# Patient Record
Sex: MALE | Race: WHITE | Employment: FULL TIME | URBAN - METROPOLITAN AREA
[De-identification: names, ages, dates, MRNs, and addresses within clinical notes are randomized per-mention and may not be internally consistent; named-entity substitution may affect disease eponyms.]

---

## 2019-08-06 ENCOUNTER — HOSPITAL ENCOUNTER (EMERGENCY)
Facility: CLINIC | Age: 29
Discharge: HOME | End: 2019-08-06
Attending: EMERGENCY MEDICINE | Admitting: EMERGENCY MEDICINE
Payer: OTHER MISCELLANEOUS

## 2019-08-06 VITALS
HEIGHT: 67 IN | TEMPERATURE: 97.7 F | SYSTOLIC BLOOD PRESSURE: 126 MMHG | DIASTOLIC BLOOD PRESSURE: 83 MMHG | WEIGHT: 125 LBS | BODY MASS INDEX: 19.62 KG/M2 | HEART RATE: 72 BPM | RESPIRATION RATE: 16 BRPM | OXYGEN SATURATION: 100 %

## 2019-08-06 DIAGNOSIS — S81.812A LACERATION OF LEFT LOWER EXTREMITY, INITIAL ENCOUNTER: ICD-10-CM

## 2019-08-06 PROCEDURE — 99283 EMERGENCY DEPT VISIT LOW MDM: CPT | Mod: 25

## 2019-08-06 PROCEDURE — 90715 TDAP VACCINE 7 YRS/> IM: CPT | Performed by: EMERGENCY MEDICINE

## 2019-08-06 PROCEDURE — 90471 IMMUNIZATION ADMIN: CPT

## 2019-08-06 PROCEDURE — 12002 RPR S/N/AX/GEN/TRNK2.6-7.5CM: CPT

## 2019-08-06 PROCEDURE — 25000128 H RX IP 250 OP 636: Performed by: EMERGENCY MEDICINE

## 2019-08-06 RX ADMIN — CLOSTRIDIUM TETANI TOXOID ANTIGEN (FORMALDEHYDE INACTIVATED), CORYNEBACTERIUM DIPHTHERIAE TOXOID ANTIGEN (FORMALDEHYDE INACTIVATED), BORDETELLA PERTUSSIS TOXOID ANTIGEN (GLUTARALDEHYDE INACTIVATED), BORDETELLA PERTUSSIS FILAMENTOUS HEMAGGLUTININ ANTIGEN (FORMALDEHYDE INACTIVATED), BORDETELLA PERTUSSIS PERTACTIN ANTIGEN, AND BORDETELLA PERTUSSIS FIMBRIAE 2/3 ANTIGEN 0.5 ML: 5; 2; 2.5; 5; 3; 5 INJECTION, SUSPENSION INTRAMUSCULAR at 11:15

## 2019-08-06 ASSESSMENT — MIFFLIN-ST. JEOR: SCORE: 1480.63

## 2019-08-06 NOTE — ED PROVIDER NOTES
"  History     Chief Complaint:  Laceration    HPI   Indra Barr is a 29 year old, otherwise healthy male who presents for evaluation of a laceration. This morning,  He reports he was cutting a metal wire at work. When he tried to step over it, he did not quite make it all the way and sustained a laceration to his left calf. Here, he denies any numbness or tingling to the lower extremity. He denies any other injuries today or current symptoms. He does not know when his last tetanus booster was, but he does report being fully vaccinated as a child. He is not anticoagulated.     Allergies:  NKDA     Medications:    The patient is currently on no regular medications.      Past Medical History:    The patient denies any significant past medical history.    Past Surgical History:    The patient does not have any pertinent past surgical history  Family History:    No past pertinent family history.    Social History:  Incident occurred at work.     Review of Systems   All other systems reviewed and are negative.    Physical Exam     Patient Vitals for the past 24 hrs:   BP Temp Temp src Pulse Resp SpO2 Height Weight   08/06/19 1012 126/83 97.7  F (36.5  C) Temporal 72 16 100 % 1.702 m (5' 7\") 56.7 kg (125 lb)      Physical Exam  General: Resting on the bed.  Head: No obvious trauma to head.  Ears, Nose, Throat:  External ears normal.  Nose normal.    Eyes:  Conjunctivae clear.  CV: Regular rate and rhythm.  No murmurs.      Respiratory: Effort normal and breath sounds normal.  No wheezing or crackles.   Gastrointestinal: Soft.  No distension. There is no tenderness.   Musculoskeletal: Full strength with plantar and dorsiflexion of the left foot.  2+ DP pulses.  Sensation intact throughout.  Neuro: Alert. Moving all extremities appropriately.  Normal speech.    Skin: Skin is warm and dry.  3.5 cm laceration noted on the left lower extremity on the medial aspect.    Emergency Department Course     Procedures:    " Laceration Repair        LACERATION:  A simple clean 3.5 cm laceration.      LOCATION:  Left calf      FUNCTION:  Distally sensation, circulation, motor and tendon function are intact.      ANESTHESIA:  Local using 0.5% bupivacaine w/ epinephrine       PREPARATION:  Irrigation and Scrubbing with Normal Saline and Sea Clens      DEBRIDEMENT:  no debridement      CLOSURE:  Wound was closed with One Layer.  Skin closed with 6 x 4.0 Ethilon using interrupted sutures.    Interventions:  1115 Tdap, 0.5 mL, IM injection    Emergency Department Course:  Nursing notes and vitals reviewed.   1014: I performed an exam of the patient as documented above.      Wound anesthetized and cleaned.     Medicine administered as documented above.     1125: I rechecked the patient and performed the laceration repair, as documented above.     I personally answered all related questions prior to discharge. He was given a referral to primary care for suture removal.       Impression & Plan      Medical Decision Making:  Indra Barr is a 29 year old male presented to the Emergency Department with a laceration.  Given the time of the injury, the wound was felt amenable to primary closure.  After adequate anesthesia was obtained, the wound was thoroughly irrigated and examined.  There is no evidence of muscular, tendon, or bony involvement at this time, nor signs or symptoms of neurovascular compromise or foreign body.  We discussed possible x-ray but given this is a superficial laceration and we are able to see the base of the wound did not feel that x-ray is warranted to look for foreign body.  Do not feel that foreign body is likely.  No signs of any deeper space injuries.  Wound was closed with sutures as noted above.  Tetanus was updated.    We discussed appropriate wound care instructions including keeping the wound dry for the first 24-48 hours, followed be gentle cleansing with soap and water.  We discussed application of sunscreen  to the affected area once scar has formed, to minimize long term scar formation.  Warning signs of infection (erythema, warmth, worsening pain, drainage of pus) were discussed, which should prompt return to the ER for re-evaluation and the patient verbalized understanding.  Will plan for suture/staple removal in 7-10 days.  Patient was encouraged to return to the ER or follow-up with PCP in the meantime should any new or troubling symptoms develop.      Diagnosis:    ICD-10-CM    1. Laceration of left lower extremity, initial encounter S81.812A        Disposition:  discharged to home    Scribe Disclosure:  I, Caren Ansari, am serving as a scribe on 8/6/2019 at 10:13 AM to personally document services performed by Alejandrina Daniels MD based on my observations and the provider's statements to me.      Caren Ansari  8/6/2019    EMERGENCY DEPARTMENT       Alejandrina Daniels MD  08/06/19 2407

## 2019-08-06 NOTE — ED AVS SNAPSHOT
Emergency Department  [address] [place] [id number]  Phone:  [phone]  Fax:  [phone]                                    Indra Barr   MRN: [id number]    Department:   Emergency Department   Date of Visit:  8/6/2019           After Visit Summary Signature Page    I have received my discharge instructions, and my questions have been answered. I have discussed any challenges I see with this plan with the nurse or doctor.    ..........................................................................................................................................  Patient/Patient Representative Signature      ..........................................................................................................................................  Patient Representative Print Name and Relationship to Patient    ..................................................               ................................................  Date                                   Time    ..........................................................................................................................................  Reviewed by Signature/Title    ...................................................              ..............................................  Date                                               Time          22EPIC Rev 08/18

## 2019-08-06 NOTE — DISCHARGE INSTRUCTIONS
Please return to the the ED if you notice increasing redness, warmth, swelling, drainage or fevers, this is concerning for infection.  Keep wound clean and dry, wash after the first 24 hours with warm soapy water.      Have sutures removed in 7-10 days    Discharge Instructions  Laceration (Cut)    You were seen today for a laceration (cut).  Your provider examined your laceration for any problems such a buried foreign body (like glass, a splinter, or gravel), or injury to blood vessels, tendons, and nerves.  Your provider may have also rinsed and/or scrubbed your laceration to help prevent an infection. It may not be possible to find all problems with your laceration on the first visit; occasionally foreign bodies or a tendon injury can go undetected.    Your laceration may have been closed in one of several ways:  No closure: many wounds will heal just fine without closure.  Stitches: regular stitches that require removal.  Staples: skin staples are often used in the scalp/head.  Wound adhesive (glue): skin glue can be used for certain lacerations and doesn t require removal.  Wound strips (aka Butterfly bandages or steri-strips): these are bandages that help to close a wound.  Absorbable stitches:  dissolving  stitches that go away on their own and usually don t require removal.    A small percentage of wounds will develop an infection regardless of how well the wound is cared for. Antibiotics are generally not indicated to prevent an infection so are only given for a small number of high-risk wounds. Some lacerations are too high risk to close, and are left open to heal because closure can increase the likelihood that an infection will develop.    Remember that all lacerations, no matter how expertly repaired, will cause scarring. We consider many factors, techniques, and materials, in our efforts to provide the best possible cosmetic outcome.    Generally, every Emergency Department visit should have a  follow-up clinic visit with either a primary or a specialty clinic/provider. Please follow-up as instructed by your emergency provider today.     Return to the Emergency Department right away if:  You have more redness, swelling, pain, drainage (pus), a bad smell, or red streaking from your laceration as these symptoms could indicate an infection.  You have a fever of 100.4 F or more.  You have bleeding that you cannot stop at home. If your cut starts to bleed, hold pressure on the bleeding area with a clean cloth or put pressure over the bandage.  If the bleeding does not stop after using constant pressure for 30 minutes, you should return to the Emergency Department for further treatment.  An area past the laceration is cool, pale, or blue compared with the other side, or has a slower return of color when squeezed.  Your dressing seems too tight or starts to get uncomfortable or painful. For children, signs of a problem might be irritability or restlessness.  You have loss of normal function or use of an area, such as being unable to straighten or bend a finger normally.  You have a numb area past the laceration.    Return to the Emergency Department or see your regular provider if:  The laceration starts to come open.   You have something coming out of the cut or a feeling that there is something in the laceration.  Your wound will not heal, or keeps breaking open. There can always be glass, wood, dirt or other things in any wound.  They will not always show up, even on x-rays.  If a wound does not heal, this may be why, and it is important to follow-up with your regular provider.    Home Care:  Take your dressing off in 12-24 hours, or as instructed by your provider, to check your laceration. Remove the dressing sooner if it seems too tight or painful, or if it is getting numb, tingly, or pale past the dressing.  Gently wash your laceration 1-2 times daily with clean water and mild soap. It is okay to shower or  run clean water over the laceration, but do not let the laceration soak in water (no swimming).  If your laceration was closed with wound adhesive or strips: pat it dry and leave it open to the air. For all other repairs: after you wash your laceration, or at least 2 times a day, apply antibiotic ointment (such as Neosporin  or Bacitracin ) to the laceration, then cover it with a Band-Aid  or gauze.  Keep the laceration clean. Wear gloves or other protective clothing if you are around dirt.    Follow-up for removal:  If your wound was closed with staples or regular stitches, they need to be removed according to the instructions and timeline specified by your provider today.  If your wound was closed with absorbable ( dissolving ) sutures, they should fall out, dissolve, or not be visible in about one week. If they are still visible, then they should be removed according to the instructions and timeline specified by your provider today.    Scars:  To help minimize scarring:  Wear sunscreen over the healed laceration when out in the sun.  Massage the area regularly once healed.  You may apply Vitamin E to the healed wound.  Wait. Scars improve in appearance over months and years.    If you were given a prescription for medicine here today, be sure to read all of the information (including the package insert) that comes with your prescription.  This will include important information about the medicine, its side effects, and any warnings that you need to know about.  The pharmacist who fills the prescription can provide more information and answer questions you may have about the medicine.  If you have questions or concerns that the pharmacist cannot address, please call or return to the Emergency Department.       Remember that you can always come back to the Emergency Department if you are not able to see your regular provider in the amount of time listed above, if you get any new symptoms, or if there is anything  that worries you.

## 2025-05-28 ENCOUNTER — OFFICE VISIT (OUTPATIENT)
Dept: INTERNAL MEDICINE | Facility: CLINIC | Age: 35
End: 2025-05-28
Payer: COMMERCIAL

## 2025-05-28 VITALS
HEART RATE: 108 BPM | RESPIRATION RATE: 10 BRPM | WEIGHT: 133.6 LBS | SYSTOLIC BLOOD PRESSURE: 104 MMHG | DIASTOLIC BLOOD PRESSURE: 63 MMHG | TEMPERATURE: 98 F | BODY MASS INDEX: 20.97 KG/M2 | OXYGEN SATURATION: 96 % | HEIGHT: 67 IN

## 2025-05-28 DIAGNOSIS — Z13.228 SCREENING FOR METABOLIC DISORDER: ICD-10-CM

## 2025-05-28 DIAGNOSIS — M25.50 CHRONIC JOINT PAIN: Primary | ICD-10-CM

## 2025-05-28 DIAGNOSIS — Z11.59 NEED FOR HEPATITIS C SCREENING TEST: ICD-10-CM

## 2025-05-28 DIAGNOSIS — Z11.4 SCREENING FOR HIV (HUMAN IMMUNODEFICIENCY VIRUS): ICD-10-CM

## 2025-05-28 DIAGNOSIS — G89.29 CHRONIC JOINT PAIN: Primary | ICD-10-CM

## 2025-05-28 DIAGNOSIS — Z13.0 SCREENING, IRON DEFICIENCY ANEMIA: ICD-10-CM

## 2025-05-28 LAB
ERYTHROCYTE [DISTWIDTH] IN BLOOD BY AUTOMATED COUNT: 12.6 % (ref 10–15)
HCT VFR BLD AUTO: 41.8 % (ref 40–53)
HGB BLD-MCNC: 14.8 G/DL (ref 13.3–17.7)
MCH RBC QN AUTO: 30.3 PG (ref 26.5–33)
MCHC RBC AUTO-ENTMCNC: 35.4 G/DL (ref 31.5–36.5)
MCV RBC AUTO: 86 FL (ref 78–100)
PLATELET # BLD AUTO: 244 10E3/UL (ref 150–450)
RBC # BLD AUTO: 4.88 10E6/UL (ref 4.4–5.9)
WBC # BLD AUTO: 11.9 10E3/UL (ref 4–11)

## 2025-05-28 PROCEDURE — 87389 HIV-1 AG W/HIV-1&-2 AB AG IA: CPT | Performed by: STUDENT IN AN ORGANIZED HEALTH CARE EDUCATION/TRAINING PROGRAM

## 2025-05-28 PROCEDURE — 99385 PREV VISIT NEW AGE 18-39: CPT | Performed by: STUDENT IN AN ORGANIZED HEALTH CARE EDUCATION/TRAINING PROGRAM

## 2025-05-28 PROCEDURE — 3078F DIAST BP <80 MM HG: CPT | Performed by: STUDENT IN AN ORGANIZED HEALTH CARE EDUCATION/TRAINING PROGRAM

## 2025-05-28 PROCEDURE — 86803 HEPATITIS C AB TEST: CPT | Performed by: STUDENT IN AN ORGANIZED HEALTH CARE EDUCATION/TRAINING PROGRAM

## 2025-05-28 PROCEDURE — 85027 COMPLETE CBC AUTOMATED: CPT | Performed by: STUDENT IN AN ORGANIZED HEALTH CARE EDUCATION/TRAINING PROGRAM

## 2025-05-28 PROCEDURE — 3074F SYST BP LT 130 MM HG: CPT | Performed by: STUDENT IN AN ORGANIZED HEALTH CARE EDUCATION/TRAINING PROGRAM

## 2025-05-28 PROCEDURE — 36415 COLL VENOUS BLD VENIPUNCTURE: CPT | Performed by: STUDENT IN AN ORGANIZED HEALTH CARE EDUCATION/TRAINING PROGRAM

## 2025-05-28 PROCEDURE — 80053 COMPREHEN METABOLIC PANEL: CPT | Performed by: STUDENT IN AN ORGANIZED HEALTH CARE EDUCATION/TRAINING PROGRAM

## 2025-05-28 PROCEDURE — 99213 OFFICE O/P EST LOW 20 MIN: CPT | Mod: 25 | Performed by: STUDENT IN AN ORGANIZED HEALTH CARE EDUCATION/TRAINING PROGRAM

## 2025-05-28 RX ORDER — CYCLOBENZAPRINE HCL 5 MG
5 TABLET ORAL 3 TIMES DAILY PRN
Qty: 45 TABLET | Refills: 1 | Status: SHIPPED | OUTPATIENT
Start: 2025-05-28 | End: 2025-06-27

## 2025-05-28 SDOH — HEALTH STABILITY: PHYSICAL HEALTH: ON AVERAGE, HOW MANY MINUTES DO YOU ENGAGE IN EXERCISE AT THIS LEVEL?: 150+ MIN

## 2025-05-28 SDOH — HEALTH STABILITY: PHYSICAL HEALTH: ON AVERAGE, HOW MANY DAYS PER WEEK DO YOU ENGAGE IN MODERATE TO STRENUOUS EXERCISE (LIKE A BRISK WALK)?: 5 DAYS

## 2025-05-28 ASSESSMENT — SOCIAL DETERMINANTS OF HEALTH (SDOH): HOW OFTEN DO YOU GET TOGETHER WITH FRIENDS OR RELATIVES?: ONCE A WEEK

## 2025-05-28 NOTE — PROGRESS NOTES
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 He has never used smokeless tobacco.  Nicotine/Tobacco Cessation Plan  Information offered: Patient not interested at this time      Counseling  Appropriate preventive services were addressed with this patient via screening, questionnaire, or discussion as appropriate for fall prevention, nutrition, physical activity, Tobacco-use cessation, social engagement, weight loss and cognition.  Checklist reviewing preventive services available has been given to the patient.  Reviewed patient's diet, addressing concerns and/or questions.   The patient was instructed to see the dentist every 6 months.   He is at risk for psychosocial distress and has been provided with information to reduce risk.       Adela Burrell is a 34 year old, presenting for the following:  Physical (Non fasting.)        5/28/2025     3:10 PM   Additional Questions   Roomed by NEIL Vicente   Accompanied by Self                5/28/2025   General Health   How would you rate your overall physical health? Good   Feel stress (tense, anxious, or unable to sleep) To some extent   (!) STRESS CONCERN      5/28/2025   Nutrition   Three or more servings of calcium each day? Yes   Diet: I don't know   How many servings of fruit and vegetables per day? (!) 2-3   How many sweetened beverages each day? (!) 4+         5/28/2025   Exercise   Days per week of moderate/strenous exercise 5 days    Average minutes spent exercising at this level 150+ min         5/28/2025   Social Factors   Frequency of gathering with friends or relatives Once a week   Worry food won't last until get money to buy more No   Food not last or not have enough money for food? No   Do you have housing? (Housing is defined as stable permanent housing and does not include staying outside in a car, in a tent, in an abandoned building, in an overnight shelter, or couch-surfing.) Yes   Are you worried about losing your housing? No   Lack of transportation? No   Unable to get utilities (heat,electricity)? No         5/28/2025   Dental   Dentist two times every year? (!) NO         Today's PHQ-2 Score:       5/28/2025     3:04 PM   PHQ-2 ( 1999 [employer])   Q1: Little interest or pleasure in doing things 0   Q2: Feeling down, depressed or hopeless 0   PHQ-2 Score 0    Q1: Little interest or pleasure in doing things Not at all   Q2: Feeling down, depressed or hopeless Not at all   PHQ-2 Score 0       Patient-reported           5/28/2025   Substance Use   Alcohol more than 3/day or more than 7/wk No   Do you use any other substances recreationally? (!) CANNABIS PRODUCTS     Social History     Tobacco Use    Smoking status: Every Day     Types: Cigarettes    Smokeless tobacco: Never   Vaping Use    Vaping status: Never Used           5/28/2025   One time HIV Screening   Previous HIV test? No         5/28/2025   STI Screening   New sexual partner(s) since last STI/HIV test? No         5/28/2025   Contraception/Family Planning   Questions about contraception or family planning No       History reviewed. No pertinent past medical history.  History reviewed. No pertinent surgical history.      Review of Systems  Constitutional, neuro, ENT, endocrine, pulmonary, cardiac, gastrointestinal, genitourinary, musculoskeletal, integument and psychiatric systems are negative, except as otherwise noted.     Objective    Exam  /63 (BP Location: Left  "arm, Patient Position: Sitting, Cuff Size: Adult Regular)   Pulse 108   Temp 98  F (36.7  C) (Oral)   Resp 10   Ht 1.695 m (5' 6.75\")   Wt 60.6 kg (133 lb 9.6 oz)   SpO2 96%   BMI 21.08 kg/m     Estimated body mass index is 21.08 kg/m  as calculated from the following:    Height as of this encounter: 1.695 m (5' 6.75\").    Weight as of this encounter: 60.6 kg (133 lb 9.6 oz).    Physical Exam  Vitals reviewed.   Constitutional:       Appearance: Normal appearance.   HENT:      Head: Atraumatic.   Eyes:      Extraocular Movements: Extraocular movements intact.   Cardiovascular:      Rate and Rhythm: Normal rate and regular rhythm.   Pulmonary:      Breath sounds: Normal breath sounds.   Abdominal:      General: Abdomen is flat.   Musculoskeletal:         General: Normal range of motion.   Skin:     General: Skin is warm.   Neurological:      General: No focal deficit present.   Psychiatric:         Mood and Affect: Mood normal.         Thought Content: Thought content normal.           Signed Electronically by: Kai Horne MD  "

## 2025-05-29 ENCOUNTER — PATIENT OUTREACH (OUTPATIENT)
Dept: CARE COORDINATION | Facility: CLINIC | Age: 35
End: 2025-05-29
Payer: COMMERCIAL

## 2025-05-29 LAB
ALBUMIN SERPL BCG-MCNC: 5 G/DL (ref 3.5–5.2)
ALP SERPL-CCNC: 81 U/L (ref 40–150)
ALT SERPL W P-5'-P-CCNC: 18 U/L (ref 0–70)
ANION GAP SERPL CALCULATED.3IONS-SCNC: 11 MMOL/L (ref 7–15)
AST SERPL W P-5'-P-CCNC: 29 U/L (ref 0–45)
BILIRUB SERPL-MCNC: 0.4 MG/DL
BUN SERPL-MCNC: 15.3 MG/DL (ref 6–20)
CALCIUM SERPL-MCNC: 9.9 MG/DL (ref 8.8–10.4)
CHLORIDE SERPL-SCNC: 101 MMOL/L (ref 98–107)
CREAT SERPL-MCNC: 0.91 MG/DL (ref 0.67–1.17)
EGFRCR SERPLBLD CKD-EPI 2021: >90 ML/MIN/1.73M2
GLUCOSE SERPL-MCNC: 74 MG/DL (ref 70–99)
HCO3 SERPL-SCNC: 28 MMOL/L (ref 22–29)
HCV AB SERPL QL IA: NONREACTIVE
HIV 1+2 AB+HIV1 P24 AG SERPL QL IA: NONREACTIVE
POTASSIUM SERPL-SCNC: 4.6 MMOL/L (ref 3.4–5.3)
PROT SERPL-MCNC: 7.6 G/DL (ref 6.4–8.3)
SODIUM SERPL-SCNC: 140 MMOL/L (ref 135–145)

## 2025-06-05 ENCOUNTER — OFFICE VISIT (OUTPATIENT)
Dept: PALLIATIVE MEDICINE | Facility: CLINIC | Age: 35
End: 2025-06-05
Attending: STUDENT IN AN ORGANIZED HEALTH CARE EDUCATION/TRAINING PROGRAM
Payer: COMMERCIAL

## 2025-06-05 VITALS — HEART RATE: 97 BPM | DIASTOLIC BLOOD PRESSURE: 84 MMHG | SYSTOLIC BLOOD PRESSURE: 111 MMHG | OXYGEN SATURATION: 99 %

## 2025-06-05 DIAGNOSIS — M47.812 SPONDYLOSIS OF CERVICAL REGION WITHOUT MYELOPATHY OR RADICULOPATHY: Primary | ICD-10-CM

## 2025-06-05 DIAGNOSIS — M47.817 SPONDYLOSIS OF LUMBOSACRAL REGION WITHOUT MYELOPATHY OR RADICULOPATHY: ICD-10-CM

## 2025-06-05 DIAGNOSIS — M79.18 MYALGIA, MULTIPLE SITES: ICD-10-CM

## 2025-06-05 DIAGNOSIS — G89.29 CHRONIC INTRACTABLE PAIN: ICD-10-CM

## 2025-06-05 RX ORDER — METHOCARBAMOL 500 MG/1
500 TABLET, FILM COATED ORAL 3 TIMES DAILY PRN
Qty: 90 TABLET | Refills: 2 | Status: SHIPPED | OUTPATIENT
Start: 2025-06-05 | End: 2025-09-03

## 2025-06-05 RX ORDER — CELECOXIB 100 MG/1
100 CAPSULE ORAL 2 TIMES DAILY PRN
Qty: 60 CAPSULE | Refills: 1 | Status: SHIPPED | OUTPATIENT
Start: 2025-06-05 | End: 2025-08-04

## 2025-06-05 ASSESSMENT — PAIN SCALES - PAIN ENJOYMENT GENERAL ACTIVITY SCALE (PEG)
INTERFERED_GENERAL_ACTIVITY: 7
PEG_TOTALSCORE: 6.33
INTERFERED_ENJOYMENT_LIFE: 7
INTERFERED_ENJOYMENT_LIFE: 7
PEG_TOTALSCORE: 6.33
AVG_PAIN_PASTWEEK: 5
INTERFERED_GENERAL_ACTIVITY: 7
AVG_PAIN_PASTWEEK: 5

## 2025-06-05 ASSESSMENT — ANXIETY QUESTIONNAIRES
2. NOT BEING ABLE TO STOP OR CONTROL WORRYING: NOT AT ALL
IF YOU CHECKED OFF ANY PROBLEMS ON THIS QUESTIONNAIRE, HOW DIFFICULT HAVE THESE PROBLEMS MADE IT FOR YOU TO DO YOUR WORK, TAKE CARE OF THINGS AT HOME, OR GET ALONG WITH OTHER PEOPLE: NOT DIFFICULT AT ALL
GAD7 TOTAL SCORE: 2
7. FEELING AFRAID AS IF SOMETHING AWFUL MIGHT HAPPEN: NOT AT ALL
3. WORRYING TOO MUCH ABOUT DIFFERENT THINGS: NOT AT ALL
8. IF YOU CHECKED OFF ANY PROBLEMS, HOW DIFFICULT HAVE THESE MADE IT FOR YOU TO DO YOUR WORK, TAKE CARE OF THINGS AT HOME, OR GET ALONG WITH OTHER PEOPLE?: NOT DIFFICULT AT ALL
1. FEELING NERVOUS, ANXIOUS, OR ON EDGE: NOT AT ALL
5. BEING SO RESTLESS THAT IT IS HARD TO SIT STILL: SEVERAL DAYS
GAD7 TOTAL SCORE: 2
GAD7 TOTAL SCORE: 2
7. FEELING AFRAID AS IF SOMETHING AWFUL MIGHT HAPPEN: NOT AT ALL
4. TROUBLE RELAXING: SEVERAL DAYS
6. BECOMING EASILY ANNOYED OR IRRITABLE: NOT AT ALL

## 2025-06-05 ASSESSMENT — PAIN SCALES - GENERAL: PAINLEVEL_OUTOF10: MILD PAIN (3)

## 2025-06-05 NOTE — PATIENT INSTRUCTIONS
Diagnostics:   Lumbar and cervical MRI - ordered      Medications:  Start celebrex 100mg q 12 hrs.  Start Robaxiin 500mg three times daily as needed. Do not take if you took Flexeril/cyclobenzaprine in last 5 hrs.  Stop OTC ibuprofen.    Consider magnesium glycinate 400mg at bedtime.  Consider tumeric supplements.    The following OTC pain medications may be helpful, use as directed: Voltaren Gel 1%, CBD products, Arnica products, Capsaicin products, Australian Dream Cream, Epson It, Lidocaine Patch, Solanpas, Biofreeze, Aspercream, Tiger Balm and Anup Emu cream.  Apply heat or cold PRN.      Therapies:  Discussed anti-inflammatory lifestyle.    PHYSICAL THERAPY - referral place today       Discussed the importance of core strengthening, ROM, stretching exercises with the patient and how each of these entities is important in decreasing pain.  Explained to the patient that the purpose of physical therapy is to teach the patient a home exercise program.  These exercises need to be performed every day in order to decrease pain and prevent future occurrences of pain.        Discussed Grounding Mat - handout provided  https://www.Odersun.com/watch?v=SuVHNE9Ru3D    Discussed Frequency Specific Microcurrent - handout provided  Treatment for Neuropathic Pain.   [facility] [phone]  [facility] [phone]  [facility] [phone]  [facility] [phone]  May be able to be billed as a chiropractic service depending on your insurance coverage.     Discussed Acupuncture.    Discussed TENs unit.      Interventions:  B/l cervical and upper thoracic TPI.      Follow up:   Return to clinic in 2 wks to review cervical and lumbar MRI.        VALERY Keyes, FNP  [facility]          Clinic Number:  [phone]   Call with any questions about your care and for scheduling assistance.   Calls are returned Monday  through Friday between 8 AM and 4:30 PM. We usually get back to you within 2 business days depending on the issue/request.    If we are prescribing your medications:  For opioid medication refills, call the clinic or send a [employer] message 7 days in advance.  Please include:  Name of requested medication  Name of the pharmacy.  For non-opioid medications, call your pharmacy directly to request a refill. Please allow 3-4 days to be processed.   Per [place] State Law:  All controlled substance prescriptions must be filled within 30 days of being written.    For those controlled substances allowing refills, pickup must occur within 30 days of last fill.      We believe regular attendance is key to your success in our program!    Any time you are unable to keep your appointment we ask that you call us at least 24 hours in advance to cancel.This will allow us to offer the appointment time to another patient.   Multiple missed appointments may lead to dismissal from the clinic.

## 2025-06-05 NOTE — PROGRESS NOTES
.June 5, 2025        COMPREHENSIVE PAIN CLINIC INITIAL EVALUATION  I had the pleasure of meeting Mr. Indra Barr on 6/5/2025 in [facility] in consult for Dr. Kai Horne with regards to his pain.  The patient is a 34 year old male with past medical history of chronic joint pain, myalgia, tobacco abuse, recreational cannabis who presents for evaluation of chronic pain.      ___________________________________________________________________  History of of chronic pain on initial exam 6/5/2025                               Subjective:  He presents alone.    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   Patient has not had any spine or joint surgery in the past.  The patient describes the pain as constant, dull, burning, squeezing to sharp.  Patient denies numbness and tingling in any extremities.  Patient denies weakness anywhere.  He reports that the pain is made worse by work activities, lifting, climbing.  His pain is improved with laying down,  tub, hot showers.   He rates his currenty pain score at 5/10, but it can be as low as 3/10 or as severe as 7/10.  He has never had physical therapy.     Patient denies anxiety and depression.  Patient does not follow with a mental health care provider.  Patient exercises by stretching.    He denies any new problems with falls or balance, any new numbness or weakness of the arms or legs, any new bowel or bladder incontinence, any night sweats or unexplained fevers, or any sudden or unexpected weight loss.  He denies saddle anesthesia. He denies changes in gait, instability, or falling episodes.     He is not interested in opioids to treat his pain.  Two of his friends pass away of opioid over dose.    Indra Barr has not been seen at a pain clinic in the past.        Progress Notes Reviewed:  5/28/2025 Dr. Kai Horne - refer to pain  mgmt        Current Treatments:  Flexeril 5mg 1 tab q hs - too sedating for daytime doses  Ibuprofen 200mg 800mg TID  Heat      Anticoagulation:  none  Implantable devices:  none    Previous Medication Treatments Included:  Anti-convulsants:   Muscle relaxors:   Anti-depressants:   Benzodiazapine's:   Acetaminophen/NSAIDs:   Topicals: lidocaine patches, icy hot, CBD roll on, voltaren gel  Opioids: Tramadol and percocet in 2024      Other Treatments Have Included:  Physical therapy: no  Pain Psychology: no  Chiropractic: yes - not helpful  Acupuncture: no  TENs Unit: yes not helpful  Injections: no  Surgeries: no spine or joint surgeries  Dry Needling: no  Massage:yes temporary relief      Past Medical History:  Medical history reviewed.  No past medical history on file.   There is no problem list on file for this patient.        Past Surgical History:  Pertinent surgical history reviewed.  No past surgical history on file.       Medications: Pertinent medications reviewed.  Current Outpatient Medications   Medication Sig Dispense Refill    cyclobenzaprine (FLEXERIL) 5 MG tablet Take 1 tablet (5 mg) by mouth 3 times daily as needed for muscle spasms. 45 tablet 1       [place] Prescription Monitoring Program reviewed 6/4/2025.  No concern for abuse or misuse of controlled medications based on this report.  9/13/2024 Tramadol 50mg 12 tabs for 4 day  9/11/2024 Percocet 5/325mg 12 tabs for 1 day        Allergies: Pertinent allergies reviewed.     Allergies   Allergen Reactions    Seasonal Allergies        Family History:   family history is not on file.    Social History:   He is single and lives in [place], [place]. He has a significant other. He works full time in construction. He has two children. He is independent in ADL's.  He uses tobacco products and recreational cannabis.  No history of chemical dependency.  He enjoys snow boarding and [employer] life.  He  reports that he has been smoking cigarettes. He has never used smokeless  tobacco.  Social History     Social History Narrative    Not on file         Review of Systems:      (Positive responses bolded)  GENERAL: fever/chills, fatigue, general unwell feeling, weight gain/loss  HEAD/EYES:  headache, dizziness, or vision changes  EARS/NOSE/THROAT: nosebleeds, hearing loss, sinus infection, earache, tinnitus  IMMUNE:  allergies, cancer, immune deficiency, or infections  SKIN:  itching, rash, hives  HEME/Lymphatic: anemia, easy bruising, easy bleeding  RESPIRATORY: cough, wheezing, or shortness of breath  CARDIOVASCULAR/Circulation: extremity edema, syncope, hypertension, tachycardia, or angina  GASTROINTESTINAL: abdominal pain, nausea/emesis, diarrhea, constipation, hematochezia, or melena  ENDOCRINE:  diabetes, steroid use, thyroid disease or osteoporosis  MUSCULOSKELETAL: myalgias, joint pain, stiffness, neck pain, back pain, arthritis, or gout  GENITOURINARY: frequency, urgency, dysuria, difficulty voiding, hematuria or incontinence  NEUROLOGIC: weakness, numbness, paresthesias, seizure, tremor, stroke or memory loss  PSYCHIATRIC: depression, anxiety, stress, suicidal thoughts/attempts or mood swings      Physical Exam:  /84   Pulse 97   SpO2 99%       Constitutional: He is oriented to person, place, and time.  He appears well-developed and well-nourished. He is not in acute distress.   HENT:     Head: Normocephalic and atraumatic.     Eyes: Pupils are equal, round, and reactive to light. EOM are normal. No scleral icterus.   Pulmonary/Chest:  NWOB. No respiratory distress.   Neurological: He is alert and oriented to person, place, and time. Coordination grossly normal.  Romberg test negative. Middleton's test is negative  Skin: Skin is warm and dry. He is not diaphoretic.   Psychiatric: He has a normal mood and affect. His behavior is normal. Judgment and thought content normal.  Patient answers questions appropriately.  MSK: Gait is normal.  Patient can walk on toes, heals, heal  toe walk and perform heal to shin testing without difficulty.    Cervical spine:  ROM: full  Rotation/ext to right: painful   Rotation/ext to left: painful   Myofascial tenderness: left paracervical muscles, right paracervical muscles  Normal 5/5 UE strength bilaterally   Normal sensation to light touch in the C4-T1 distributions bilaterally   No allodynia, dysesthesia, or hyperalgesia in the upper extremities bilaterally   Reflexes: Normal 2/2 of biceps and bracioradialis    Lumbar/Thoracic spine:   ROM: flexion 60 degrees, extension 20 degrees, left lateral 20 degrees, and right lateral 20 degrees  Rotation/ext to right: painful   Rotation/ext to left: painful   Myofascial tenderness:left parathoracic muscles, right parathoracic muscles, left para lumbar muscles, right para lumbar muscles  Focal tenderness: No SI joint, gluteal, piriformis, or GT tenderness  Normal 5/5 LE strength bilaterally   Normal sensation to light touch in the lower extremities bilaterally   No allodynia, dysesthesia, or hyperalgesia in the lower extremities bilaterally   Reflexes: Lower extremity reflexes within normal limits bilaterally  Straight leg raise: Negative on the left Negative on the right        Imaging:  EXAM: CT SCAN OF LUMBAR SPINE    DATE: 9/10/2024 14:10    CLINICAL: Pain-trauma.    COMPARISON: None.    TECHNIQUE: Helical scan through the lumbar spine and sacrum. Axial, sagittal, and coronal images were generated off the helical data set.    FINDINGS: 5 lumbar type vertebrae are present. Minimal scoliosis.  No acute fracture or dislocation. Minimal degenerative retrolisthesis of L3 on L4. Slight changes of degenerative disc disease greatest at L3-4. Degenerative facet joint arthrosis greatest at L5-S1, seen to a lesser degree at L4-5. No significant degree of central or neural foraminal stenosis.  No significant focal disc herniation. No evidence of trauma at either sacroiliac joint.  Exam End: 09/10/24  2:17 PM          EMG:  na    Diagnosis:  (M47.812) Spondylosis of cervical region without myelopathy or radiculopathy  (primary encounter diagnosis)  Comment:   Plan: Physical Therapy  Referral, celecoxib         (CELEBREX) 100 MG capsule, MR Cervical Spine         w/o Contrast            (M47.817) Spondylosis of lumbosacral region without myelopathy or radiculopathy  Comment:   Plan: Physical Therapy  Referral, celecoxib         (CELEBREX) 100 MG capsule, MR Lumbar Spine w/o         Contrast            (M79.18) Myalgia, multiple sites  Comment:   Plan: PAIN INJECTION EVAL/TREAT/FOLLOW UP, Physical         Therapy  Referral, methocarbamol         (ROBAXIN) 500 MG tablet            (G89.29) Chronic intractable pain  Comment:   Plan: MR Cervical Spine w/o Contrast, MR Lumbar Spine        w/o Contrast                Plan on initial consult on 6/4/2025:  A multimodal plan was developed today to treat your pain.  Multimodal analgesia is a strategy that reduces reliance on opioids through the use of non-opioid analgesics and therapies that have different mechanisms of action.    Discussed myofacial pain and cervical and lumbar facet mediated pain.  Patient did review cervical facet mediated referral patterns and state his pain is most consistent with C4-5 and C5-6 pattern.  He has never had formal PT.  Formal PT may be required for insurance to cover additional cervical and lumbar injections.        Diagnostics:   Lumbar and cervical MRI - ordered      Medications:  Start Celebrex 100mg q 12 hrs.  Start Robaxin 500mg three times daily as needed. Do not take if you took Flexeril/cyclobenzaprine in last 5 hrs.  Stop OTC ibuprofen.    Consider magnesium glycinate 400mg at bedtime.  Consider tumeric supplements.    The following OTC pain medications may be helpful, use as directed: Voltaren Gel 1%, CBD products, Arnica products, Capsaicin products, Australian Dream Cream, Epson It, Lidocaine Patch, Solanpas,  Biofreeze, Aspercream, Tiger Balm and Anup Emu cream.  Apply heat or cold PRN.      Therapies:  Discussed anti-inflammatory lifestyle.    PHYSICAL THERAPY - referral placed today       Discussed the importance of core strengthening, ROM, stretching exercises with the patient and how each of these entities is important in decreasing pain.  Explained to the patient that the purpose of physical therapy is to teach the patient a home exercise program.  These exercises need to be performed every day in order to decrease pain and prevent future occurrences of pain.        Discussed Grounding Mat - handout provided  https://www.SendMe.com/watch?v=[email or web address]    Discussed Frequency Specific Microcurrent - handout provided  Treatment for Neuropathic Pain.   [facility] [phone]  [facility] [phone]  [facility] [phone]  [facility] [phone]  May be able to be billed as a chiropractic service depending on your insurance coverage.     Discussed Acupuncture.    Discussed TENs unit.      Interventions:  B/l cervical and upper thoracic TPI.  Discussed potential cervical MBB/RFA      Follow up:   Return to clinic in 2 wks to review cervical and lumbar MRI.        VALERY Keyes, ROSIEP  [facility] [facility]        BILLING TIME DOCUMENTATION:   The total TIME spent on this patient on the date of the encounter/appointment was 78 minutes.            Answers submitted by the patient for this visit:  Patient Health Questionnaire (G7) (Submitted on 6/5/2025)  ROCKY 7 TOTAL SCORE: 2

## 2025-06-23 ENCOUNTER — TELEPHONE (OUTPATIENT)
Dept: PALLIATIVE MEDICINE | Facility: CLINIC | Age: 35
End: 2025-06-23
Payer: COMMERCIAL

## 2025-06-23 NOTE — TELEPHONE ENCOUNTER
[facility]    Phone Message    May a detailed message be left on voicemail: yes     Reason for Call: Other: Patient calling stating the out of pocket cost for the MRI is not something he can do right now.  What other alternatives can be done.  Please call him back to advise.      Action Taken: Message routed to:  Other: [facility]     Travel Screening: Not Applicable     Date of Service:

## 2025-06-23 NOTE — TELEPHONE ENCOUNTER
Returned call to the pt to inform provider had been out of the clinic until today, and will respond when able.  Pt was very understanding and will await recommendations.

## 2025-06-24 NOTE — TELEPHONE ENCOUNTER
PT, injections, MRI's are all going to be associated with OOP expenses since insurance does not cover 100%. What is the patient looking for? Please clarify. Thank you. LD CNP     See other encounter regarding TPI cost. Will close this encounter, as similar.

## 2025-07-16 NOTE — PROGRESS NOTES
July 16, 2025      COMPREHENSIVE PAIN CLINIC FOLLOW UP EVALUATION  I had the pleasure of meeting Mr. Indra Barr on 6/5/2025 in the [facility] in consult for Dr. Kai Horne with regards to his pain.  The patient is a 34 year old male with past medical history of chronic joint pain, myalgia, tobacco abuse, recreational cannabis who presents for evaluation of chronic pain.      Updates since last appointment on June 5, 2025 initial consult.  He presents alone.    He reports that MRI, PT and injections are cost prohibited at this time and celebrex and robaxin are not effective.  Discussed other muscle relaxants are more sedating and would not be safe during work hrs.  I do not recommend opioids to treat his pain.  He is persistent and continues to ask what can we do to treat the pain until this fall and he can reassess his finances.    Discussed:  the following OTC pain medications may be helpful, use as directed: Voltaren Gel 1%, CBD products, Capsaicin products, Australian Dream Cream, Epson It, Arnica Products, Deep Blue Cream, Absorbing Luis Felipe Pro, Lidocaine Patch, Solanpas, Biofreeze, Aspercream, Tiger Balm and Anup Emu cream.  TENs, heat or cold PRN.    Also discussed grounding, frequency specific microcurrent, red light therapy, acupuncture, massage, chiropractic care are also options he can look into.      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      Characteristics:  Any changes in pain characteristics since last appointment?  no    The patient describes the pain as constant, dull, burning, squeezing to sharp.  Patient denies numbness and tingling in any extremities.   Patient denies weakness anywhere.        What makes the pain better:  His pain is improved with laying down,  tub, hot showers.      What makes the pain worse:  He reports that the pain is made worse by work activities, lifting, climbing.    7/17/2025 current pain on 0/10 VAS:       Worst pain:        Best pain:          He rates his currenty pain score at 5/10, but it can be as low as 3/10 or as severe as 7/10.    Current Pain Related Medications:  Any medications changes since last appointment: no    Celebrex 100mg q 12 hrs.  Robaxin 500mg three times daily as needed.         Therapies discuss on initial consult:   Physical therapy, Pain Psychology, TENs unit, Grounding Mat, Frequency Specific Micro Current, Anti-inflammatory Lifestyle    Social:  He has a girlfriend. He has 2 small children.  He is independent in ADL's.  No history of chemical dependency.    Employment:  He works in construction.    Exercise:  He does not exercise on a regular basis.    Hobbies:  He enjoys activities with family and friends.    Mental Health:    He denies anxiety/depression.            Plan on initial consult on 6/4/2025:  A multimodal plan was developed today to treat your pain.  Multimodal analgesia is a strategy that reduces reliance on opioids through the use of non-opioid analgesics and therapies that have different mechanisms of action.    Discussed myofacial pain and cervical and lumbar facet mediated pain.  Patient did review cervical facet mediated referral patterns and state his pain is most consistent with C4-5 and C5-6 pattern.  He has never had formal PT.  Formal PT may be required for insurance to cover additional cervical and lumbar injections.        Diagnostics:   Lumbar and cervical MRI - ordered      Medications:  Start Celebrex 100mg q 12 hrs.  Start Robaxin 500mg three times daily as needed. Do not take if you took Flexeril/cyclobenzaprine in last 5 hrs.  Stop OTC ibuprofen.    Consider magnesium  glycinate 400mg at bedtime.  Consider tumeric supplements.    The following OTC pain medications may be helpful, use as directed: Voltaren Gel 1%, CBD products, Arnica products, Capsaicin products, Australian Dream Cream, Epson It, Lidocaine Patch, Solanpas, Biofreeze, Aspercream, Tiger Balm and Anup Emu cream.  Apply heat or cold PRN.      Therapies:  Discussed anti-inflammatory lifestyle.    PHYSICAL THERAPY - referral placed today       Discussed the importance of core strengthening, ROM, stretching exercises with the patient and how each of these entities is important in decreasing pain.  Explained to the patient that the purpose of physical therapy is to teach the patient a home exercise program.  These exercises need to be performed every day in order to decrease pain and prevent future occurrences of pain.        Discussed Grounding Mat - handout provided  https://www.Ziebel.com/watch?v=[email or web address]    Discussed Frequency Specific Microcurrent - handout provided  Treatment for Neuropathic Pain.   [facility] [phone]  [facility] [phone]  [facility] [phone]  [facility] [phone]  May be able to be billed as a chiropractic service depending on your insurance coverage.     Discussed Acupuncture.    Discussed TENs unit.      Interventions:  B/l cervical and upper thoracic TPI.  Discussed potential cervical MBB/RFA      Follow up:   Return to clinic in 2 wks to review cervical and lumbar MRI.    ______________________________________________________________  History of of chronic pain on initial exam 6/5/2025                               Subjective:  He presents alone.    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   Patient has not had any spine or joint surgery in the past.  The patient describes the pain as constant, dull, burning, squeezing to  sharp.  Patient denies numbness and tingling in any extremities.  Patient denies weakness anywhere.  He reports that the pain is made worse by work activities, lifting, climbing.  His pain is improved with laying down,  tub, hot showers.   He rates his currenty pain score at 5/10, but it can be as low as 3/10 or as severe as 7/10.  He has never had physical therapy.     Patient denies anxiety and depression.  Patient does not follow with a mental health care provider.  Patient exercises by stretching.    He denies any new problems with falls or balance, any new numbness or weakness of the arms or legs, any new bowel or bladder incontinence, any night sweats or unexplained fevers, or any sudden or unexpected weight loss.     He is not interested in opioids to treat his pain.  Two of his friends pass away of opioid over dose.    Indra Barr has not been seen at a pain clinic in the past.        Progress Notes Reviewed:  5/28/2025 Dr. Kai Horne - refer to pain mgmt        Current Treatments:  Flexeril 5mg 1 tab q hs - too sedating for daytime doses  Ibuprofen 200mg 800mg TID  Heat      Anticoagulation:  none  Implantable devices:  none    Previous Medication Treatments Included:  Anti-convulsants:   Muscle relaxors:   Anti-depressants:   Benzodiazapine's:   Acetaminophen/NSAIDs:   Topicals: lidocaine patches, icy hot, CBD roll on, voltaren gel  Opioids: Tramadol and percocet in 2024      Other Treatments Have Included:  Physical therapy: no  Pain Psychology: no  Chiropractic: yes - not helpful  Acupuncture: no  TENs Unit: yes not helpful  Injections: no  Surgeries: no spine or joint surgeries  Dry Needling: no  Massage:yes temporary relief      Past Medical History:  Medical history reviewed.  No past medical history on file.   There is no problem list on file for this patient.        Past Surgical History:  Pertinent surgical history reviewed.  No past surgical history on file.       Medications:  Pertinent medications reviewed.  Current Outpatient Medications   Medication Sig Dispense Refill    celecoxib (CELEBREX) 100 MG capsule Take 1 capsule (100 mg) by mouth 2 times daily as needed for moderate pain. 60 capsule 1    methocarbamol (ROBAXIN) 500 MG tablet Take 1 tablet (500 mg) by mouth 3 times daily as needed for muscle spasms. 90 tablet 2       [place] Prescription Monitoring Program reviewed 6/4/2025.  No concern for abuse or misuse of controlled medications based on this report.  9/13/2024 Tramadol 50mg 12 tabs for 4 day  9/11/2024 Percocet 5/325mg 12 tabs for 1 day        Allergies: Pertinent allergies reviewed.     Allergies   Allergen Reactions    Seasonal Allergies        Family History:   family history is not on file.    Social History:   He is single and lives in [place], [place]. He has a significant other. He works full time in construction. He has two children. He is independent in ADL's.  He uses tobacco products and recreational cannabis.  No history of chemical dependency.  He enjoys snow boarding and cabin life.  He  reports that he has been smoking cigarettes. He has never used smokeless tobacco.  Social History     Social History Narrative    Not on file             Physical Exam:  /85 (BP Location: Right arm)   Pulse 67   SpO2 99%     Constitutional: He is oriented to person, place, and time.  He appears well-developed and well-nourished. He is not in acute distress.   HENT:     Head: Normocephalic and atraumatic.     Eyes: Pupils are equal, round, and reactive to light. EOM are normal. No scleral icterus.   Pulmonary/Chest:  NWOB. No respiratory distress.   Neurological: He is alert and oriented to person, place, and time. Coordination grossly normal.  Romberg test negative. Middleton's test is negative  Skin: Skin is warm and dry. He is not diaphoretic.   Psychiatric: He has a normal mood and affect. His behavior is normal. Judgment and thought content normal.  Patient answers questions  appropriately.  MSK: Gait is normal.  Patient can walk on toes, heals, heal toe walk and perform heal to shin testing without difficulty.    Cervical spine:  ROM: full  Rotation/ext to right: painful   Rotation/ext to left: painful   Myofascial tenderness: left paracervical muscles, right paracervical muscles  Normal 5/5 UE strength bilaterally   Normal sensation to light touch in the C4-T1 distributions bilaterally   No allodynia, dysesthesia, or hyperalgesia in the upper extremities bilaterally   Reflexes: Normal 2/2 of biceps and bracioradialis    Lumbar/Thoracic spine:   ROM: flexion 60 degrees, extension 20 degrees, left lateral 20 degrees, and right lateral 20 degrees  Rotation/ext to right: painful   Rotation/ext to left: painful   Myofascial tenderness:left parathoracic muscles, right parathoracic muscles, left para lumbar muscles, right para lumbar muscles  Focal tenderness: No SI joint, gluteal, piriformis, or GT tenderness  Normal 5/5 LE strength bilaterally   Normal sensation to light touch in the lower extremities bilaterally   No allodynia, dysesthesia, or hyperalgesia in the lower extremities bilaterally   Reflexes: Lower extremity reflexes within normal limits bilaterally  Straight leg raise: Negative on the left Negative on the right        Imaging:  EXAM: CT SCAN OF LUMBAR SPINE    DATE: 9/10/2024 14:10    CLINICAL: Pain-trauma.    COMPARISON: None.    TECHNIQUE: Helical scan through the lumbar spine and sacrum. Axial, sagittal, and coronal images were generated off the helical data set.    FINDINGS: 5 lumbar type vertebrae are present. Minimal scoliosis.  No acute fracture or dislocation. Minimal degenerative retrolisthesis of L3 on L4. Slight changes of degenerative disc disease greatest at L3-4. Degenerative facet joint arthrosis greatest at L5-S1, seen to a lesser degree at L4-5. No significant degree of central or neural foraminal stenosis.  No significant focal disc herniation. No evidence of  trauma at either sacroiliac joint.  Exam End: 09/10/24  2:17 PM         EMG:  na    Diagnosis:  (M47.812) Spondylosis of cervical region without myelopathy or radiculopathy  (primary encounter diagnosis)  Comment:   Plan: Physical Therapy  Referral, celecoxib         (CELEBREX) 100 MG capsule, MR Cervical Spine         w/o Contrast            (M47.817) Spondylosis of lumbosacral region without myelopathy or radiculopathy  Comment:   Plan: Physical Therapy  Referral, celecoxib         (CELEBREX) 100 MG capsule, MR Lumbar Spine w/o         Contrast            (M79.18) Myalgia, multiple sites  Comment:   Plan: PAIN INJECTION EVAL/TREAT/FOLLOW UP, Physical         Therapy  Referral, methocarbamol         (ROBAXIN) 500 MG tablet            (G89.29) Chronic intractable pain  Comment:   Plan: MR Cervical Spine w/o Contrast, MR Lumbar Spine        w/o Contrast                Plan on 7/17/2025:  Follow up as needed.    AVS My Chart.      VALERY Keyes, ROSIEP  [facility] Locations        BILLING TIME DOCUMENTATION:   The total TIME spent on this patient on the date of the encounter/appointment was 31 minutes.

## 2025-07-17 ENCOUNTER — OFFICE VISIT (OUTPATIENT)
Dept: PALLIATIVE MEDICINE | Facility: CLINIC | Age: 35
End: 2025-07-17
Attending: NURSE PRACTITIONER
Payer: COMMERCIAL

## 2025-07-17 VITALS — SYSTOLIC BLOOD PRESSURE: 123 MMHG | OXYGEN SATURATION: 99 % | DIASTOLIC BLOOD PRESSURE: 85 MMHG | HEART RATE: 67 BPM

## 2025-07-17 DIAGNOSIS — G89.29 CHRONIC INTRACTABLE PAIN: ICD-10-CM

## 2025-07-17 DIAGNOSIS — G89.29 CHRONIC MYOFASCIAL PAIN: ICD-10-CM

## 2025-07-17 DIAGNOSIS — M47.812 SPONDYLOSIS OF CERVICAL REGION WITHOUT MYELOPATHY OR RADICULOPATHY: Primary | ICD-10-CM

## 2025-07-17 DIAGNOSIS — M79.18 CHRONIC MYOFASCIAL PAIN: ICD-10-CM

## 2025-07-17 DIAGNOSIS — M47.817 SPONDYLOSIS OF LUMBOSACRAL REGION WITHOUT MYELOPATHY OR RADICULOPATHY: ICD-10-CM

## 2025-07-17 ASSESSMENT — PAIN SCALES - PAIN ENJOYMENT GENERAL ACTIVITY SCALE (PEG)
INTERFERED_ENJOYMENT_LIFE: 6
PEG_TOTALSCORE: 6
AVG_PAIN_PASTWEEK: 6
INTERFERED_GENERAL_ACTIVITY: 6

## 2025-07-17 ASSESSMENT — PAIN SCALES - GENERAL: PAINLEVEL_OUTOF10: MODERATE PAIN (5)

## 2025-07-17 NOTE — PATIENT INSTRUCTIONS
Plan on 7/17/2025:  Follow up as needed.    AVS My Chart.      VALERY Keyes FNP  [facility]                Clinic Number:  [phone]  Call with any questions about your care and for scheduling assistance.   Calls are returned Monday through Friday between 8 AM and 4:30 PM. We usually get back to you within 2 business days depending on the issue/request.    If we are prescribing your medications:  For opioid medication refills, call the clinic or send a [employer] message 7 days in advance.  Please include:  Name of requested medication  Name of the pharmacy.  For non-opioid medications, call your pharmacy directly to request a refill. Please allow 3-4 days to be processed.   Per [place] State Law:  All controlled substance prescriptions must be filled within 30 days of being written.    For those controlled substances allowing refills, pickup must occur within 30 days of last fill.      We believe regular attendance is key to your success in our program!    Any time you are unable to keep your appointment we ask that you call us at least 24 hours in advance to cancel.This will allow us to offer the appointment time to another patient.   Multiple missed appointments may lead to dismissal from the clinic.

## 2025-08-06 DIAGNOSIS — M47.817 SPONDYLOSIS OF LUMBOSACRAL REGION WITHOUT MYELOPATHY OR RADICULOPATHY: ICD-10-CM

## 2025-08-06 DIAGNOSIS — M47.812 SPONDYLOSIS OF CERVICAL REGION WITHOUT MYELOPATHY OR RADICULOPATHY: ICD-10-CM

## 2025-08-07 RX ORDER — CELECOXIB 100 MG/1
CAPSULE ORAL
Qty: 60 CAPSULE | Refills: 2 | Status: SHIPPED | OUTPATIENT
Start: 2025-08-07